# Patient Record
Sex: FEMALE | NOT HISPANIC OR LATINO | Employment: FULL TIME | URBAN - METROPOLITAN AREA
[De-identification: names, ages, dates, MRNs, and addresses within clinical notes are randomized per-mention and may not be internally consistent; named-entity substitution may affect disease eponyms.]

---

## 2023-10-15 ENCOUNTER — OFFICE VISIT (OUTPATIENT)
Dept: FAMILY MEDICINE | Facility: CLINIC | Age: 43
End: 2023-10-15
Payer: COMMERCIAL

## 2023-10-15 VITALS
RESPIRATION RATE: 20 BRPM | OXYGEN SATURATION: 95 % | WEIGHT: 293 LBS | HEART RATE: 77 BPM | TEMPERATURE: 98.9 F | SYSTOLIC BLOOD PRESSURE: 104 MMHG | DIASTOLIC BLOOD PRESSURE: 75 MMHG

## 2023-10-15 DIAGNOSIS — J06.9 UPPER RESPIRATORY TRACT INFECTION, UNSPECIFIED TYPE: Primary | ICD-10-CM

## 2023-10-15 LAB
FLUAV AG SPEC QL IA: NEGATIVE
FLUBV AG SPEC QL IA: NEGATIVE

## 2023-10-15 PROCEDURE — 87804 INFLUENZA ASSAY W/OPTIC: CPT | Performed by: FAMILY MEDICINE

## 2023-10-15 PROCEDURE — 87635 SARS-COV-2 COVID-19 AMP PRB: CPT | Performed by: FAMILY MEDICINE

## 2023-10-15 PROCEDURE — 99213 OFFICE O/P EST LOW 20 MIN: CPT | Performed by: FAMILY MEDICINE

## 2023-10-15 RX ORDER — NALTREXONE HYDROCHLORIDE 50 MG/1
TABLET, FILM COATED ORAL
COMMUNITY
Start: 2023-10-04 | End: 2024-10-04

## 2023-10-15 NOTE — PROGRESS NOTES
Assessment & Plan     Upper respiratory tract infection, unspecified type     - Influenza A & B Antigen - Clinic Collect  - Symptomatic COVID-19 Virus (Coronavirus) by PCR Nose        # Upper Respiratory Infection: Patient noting fever, chills, headache over the past 3 to 4 days as well as some vomiting and diarrhea.  Exam showing mild erythema the posterior pharynx, clear lungs.  Flu test negative, COVID pending.  Likely patient has an upper respiratory infection with COVID test pending.  Given this we will have her treat symptomatically and follow-up if not improving.  Testing Findings: Flu test negative, COVID test pending  Treatment: Conservative treatments as below  Job: As tolerated  Medications: Limited tylenol/ibuprofen for pain for 1-2 weeks   Follow-up: In 1-2 weeks if symptoms do not improve with primary care provider, sooner if worsening  Can consider repeat evaluation      Return if symptoms worsen or fail to improve.    Lance Bass MD  [facility] JENNIFER Brandon is a 43 year old female who presents to clinic today for the following health issues:  Chief Complaint   Patient presents with    Fever     Fever chills and headache. Symptoms started 3-4 days. Patient also has had vomiting and diarrhea.      HPI       URI Adult    Onset of symptoms was 4 day(s) ago. Had HO covid last year with GI symptoms. Had a fever yesterday 100.6F  Course of illness is same.  Was on naltrexone for weightloss  Severity moderate  Current and Associated symptoms: fever, chills, runny nose, cough - productive, shortness of breath, body aches, fatigue, vomiting, and diarrhea  Treatment measures tried include theraflu.  Predisposing factors include None.  In nursing school     Review of Systems  Constitutional, HEENT, cardiovascular, pulmonary, gi and gu systems are negative, except as otherwise noted.      Objective    /75   Pulse 77   Temp 98.9  F (37.2  C) (Oral)    Resp 20   Wt 133.4 kg (294 lb)   SpO2 95%   Physical Exam   GENERAL: healthy, alert and no distress  NECK: no adenopathy, no asymmetry, masses, or scars and thyroid normal to palpation  RESP: lungs clear to auscultation - no rales, rhonchi or wheezes  CV: regular rate and rhythm, normal S1 S2, no S3 or S4, no murmur, click or rub, no peripheral edema and peripheral pulses strong  ABDOMEN: soft, nontender, no hepatosplenomegaly, no masses and bowel sounds normal  MS: no gross musculoskeletal defects noted, no edema    Flu negative, COVID pending

## 2023-10-15 NOTE — LETTER
October 15, 2023      Roma Small  [address] [id number]        To Whom It May Concern:    Roma Small was seen in our clinic. Please excuse her from clinical rotations from 10/15-10/17/23.      Sincerely,        Lance Bass MD

## 2023-10-15 NOTE — PATIENT INSTRUCTIONS
# Upper Respiratory Infection: Patient noting fever, chills, headache over the past 3 to 4 days as well as some vomiting and diarrhea.  Exam showing mild erythema the posterior pharynx, clear lungs.  Flu test negative, COVID pending.  Likely patient has an upper respiratory infection with COVID test pending.  Given this we will have her treat symptomatically and follow-up if not improving.  Testing Findings: Flu test negative, COVID test pending  Treatment: Conservative treatments as below  Job: As tolerated  Medications: Limited tylenol/ibuprofen for pain for 1-2 weeks   Follow-up: In 1-2 weeks if symptoms do not improve with primary care provider, sooner if worsening  Can consider repeat evaluation    If you have not yet received the influenza vaccine but would like to get one, please call  [phone] or you can schedule via [employer]    It was great seeing you today!    Lance Bass MD, CAQSM

## 2023-10-16 LAB — SARS-COV-2 RNA RESP QL NAA+PROBE: NEGATIVE

## 2024-09-14 ENCOUNTER — OFFICE VISIT (OUTPATIENT)
Dept: FAMILY MEDICINE | Facility: CLINIC | Age: 44
End: 2024-09-14
Payer: COMMERCIAL

## 2024-09-14 VITALS
WEIGHT: 279 LBS | HEART RATE: 79 BPM | DIASTOLIC BLOOD PRESSURE: 86 MMHG | RESPIRATION RATE: 16 BRPM | TEMPERATURE: 99.7 F | SYSTOLIC BLOOD PRESSURE: 128 MMHG | OXYGEN SATURATION: 94 %

## 2024-09-14 DIAGNOSIS — R50.9 FEVER, UNSPECIFIED FEVER CAUSE: ICD-10-CM

## 2024-09-14 DIAGNOSIS — J06.9 VIRAL URI: Primary | ICD-10-CM

## 2024-09-14 LAB
DEPRECATED S PYO AG THROAT QL EIA: NEGATIVE
FLUAV AG SPEC QL IA: NEGATIVE
FLUBV AG SPEC QL IA: NEGATIVE
GROUP A STREP BY PCR: NOT DETECTED
SARS-COV-2 RNA RESP QL NAA+PROBE: POSITIVE

## 2024-09-14 PROCEDURE — 87635 SARS-COV-2 COVID-19 AMP PRB: CPT | Performed by: NURSE PRACTITIONER

## 2024-09-14 PROCEDURE — 87651 STREP A DNA AMP PROBE: CPT | Performed by: NURSE PRACTITIONER

## 2024-09-14 PROCEDURE — 87804 INFLUENZA ASSAY W/OPTIC: CPT | Performed by: NURSE PRACTITIONER

## 2024-09-14 PROCEDURE — 99203 OFFICE O/P NEW LOW 30 MIN: CPT | Performed by: NURSE PRACTITIONER

## 2024-09-14 ASSESSMENT — ENCOUNTER SYMPTOMS: SHORTNESS OF BREATH: 0

## 2024-09-14 NOTE — PATIENT INSTRUCTIONS
You are experiencing common viral symptoms. Viruses take 2-3 weeks to resolve on average. Antibiotics don't work on viruses.      Try Mucinex (guaifenesin) for congestion or Mucinex DM if you develop a cough.    No work/school until no temps over 100.4 for 24 hours without medicine.      Recheck if fevers over 7 days, shortness of breath, persistent ear pain or not starting to feel better in about 7 days      COVID test.  [facility]    Can take Tylenol 1000 mg 3 times a day if not in your cough medication or ibuprofen 600 mg 4 times per day asneeded for aches/pain.    For sore throat-Also try Cepacol lozenges, throat coat tea or tea with honey.    The rapid strep test is negative today.  We do 2 tests for strep.  We will call if the backup strep culture is positive tomorrow and start antibiotics.

## 2024-09-14 NOTE — LETTER
September 14, 2024      Roma Small  [address] [address]        To Whom It May Concern:    Roma Small  was seen on September 14.  Please excuse her  until fever free for 24 hours or as per CDC guidelines if COVID test is positive due to illness.        Sincerely,        Allegra Salamanca, CNP

## 2024-09-14 NOTE — PROGRESS NOTES
Assessment & Plan     Fever, unspecified fever cause    - Streptococcus A Rapid Screen w/Reflex to PCR - Clinic Collect  - Influenza A & B Antigen - Clinic Collect  - Group A Streptococcus PCR Throat Swab  - Symptomatic COVID-19 Virus (Coronavirus) by PCR Nose    Viral URI       History, exam, and vital signs with negative RST consistent with a viral URI.    Lungs are clear.  Do not suspect pneumonia at this time.  Consider this to be COVID until proven otherwise discussed with patient.  Work note provided.    Advised the following-      Try Mucinex (guaifenesin) for congestion or Mucinex DM if you develop a cough.    No work/school until no temps over 100.4 for 24 hours without medicine.      Recheck if fevers over 7 days, shortness of breath, persistent ear pain or not starting to feel better in about 7 days      COVID test.  [facility]    Can take Tylenol 1000 mg 3 times a day if not in your cough medication or ibuprofen 600 mg 4 times per day asneeded for aches/pain.    For sore throat-Also try Cepacol lozenges, throat coat tea or tea with honey.    The rapid strep test is negative today.  We do 2 tests for strep.  We will call if the backup strep culture is positive tomorrow and start antibiotics.                No follow-ups on file.    Allegra Salamanca [facility]    Belle Brandon is a 44 year old female who presents to clinic today for the following health issues:  Chief Complaint   Patient presents with    Fever     Fever since last night. Swollen tonsils nasal congestion headache back ache and body aches.        HPI    Starting this morning at about 530, patient started having shaking chills associated with headache, body aches, congestion, sore throat rated 4 out of 10, temp 101.4.  Fever reduced with ibuprofen.    Negative strep and influenza prior to my arrival in the room.  Patient has no known exposures.  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        Review of Systems   Respiratory:  Negative for shortness of breath.                Objective    /86   Pulse 79   Temp 99.7  F (37.6  C)   Resp 16   Wt 126.6 kg (279 lb)   SpO2 94%   Physical Exam  Constitutional:       General: She is not in acute distress.     Appearance: She is well-developed.   HENT:      Right Ear: Tympanic membrane normal.      Left Ear: Tympanic membrane normal.      Nose: Congestion present. No rhinorrhea.   Eyes:      General:         Right eye: No discharge.         Left eye: No discharge.      Conjunctiva/sclera: Conjunctivae normal.   Pulmonary:      Effort: Pulmonary effort is normal.      Breath sounds: Normal breath sounds. No wheezing.   Musculoskeletal:         General: Normal range of motion.   Lymphadenopathy:      Cervical: Cervical adenopathy present.   Skin:     General: Skin is warm and dry.      Capillary Refill: Capillary refill takes less than 2 seconds.   Neurological:      Mental Status: She is alert and oriented to person, place, and time.   Psychiatric:         Mood and Affect: Mood normal.         Behavior: Behavior normal.         Thought Content: Thought content normal.         Judgment: Judgment normal.

## 2024-09-15 ENCOUNTER — TELEPHONE (OUTPATIENT)
Dept: NURSING | Facility: CLINIC | Age: 44
End: 2024-09-15